# Patient Record
Sex: MALE | Race: BLACK OR AFRICAN AMERICAN | URBAN - METROPOLITAN AREA
[De-identification: names, ages, dates, MRNs, and addresses within clinical notes are randomized per-mention and may not be internally consistent; named-entity substitution may affect disease eponyms.]

---
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
## 2024-09-10 DIAGNOSIS — J45.40 ASTHMA, CHRONIC, MODERATE PERSISTENT, UNCOMPLICATED (HHS-HCC): ICD-10-CM

## 2024-09-10 RX ORDER — PREDNISONE 20 MG/1
40 TABLET ORAL DAILY
Qty: 10 TABLET | Refills: 0 | Status: SHIPPED | OUTPATIENT
Start: 2024-09-10

## 2024-09-10 RX ORDER — BUDESONIDE AND FORMOTEROL FUMARATE DIHYDRATE 160; 4.5 UG/1; UG/1
2 AEROSOL RESPIRATORY (INHALATION) 2 TIMES DAILY
COMMUNITY
End: 2024-09-10 | Stop reason: SDUPTHER

## 2024-09-10 RX ORDER — DILTIAZEM HYDROCHLORIDE 60 MG/1
2 TABLET, FILM COATED ORAL EVERY 4 HOURS PRN
COMMUNITY
End: 2024-09-10 | Stop reason: SDUPTHER

## 2024-09-10 RX ORDER — PREDNISONE 20 MG/1
40 TABLET ORAL DAILY
COMMUNITY
End: 2024-09-10 | Stop reason: SDUPTHER

## 2024-09-10 RX ORDER — BUDESONIDE AND FORMOTEROL FUMARATE DIHYDRATE 160; 4.5 UG/1; UG/1
2 AEROSOL RESPIRATORY (INHALATION) 2 TIMES DAILY
Qty: 10.2 G | Refills: 0 | Status: SHIPPED | OUTPATIENT
Start: 2024-09-10

## 2024-09-10 RX ORDER — BUDESONIDE AND FORMOTEROL FUMARATE DIHYDRATE 160; 4.5 UG/1; UG/1
2 AEROSOL RESPIRATORY (INHALATION) EVERY 4 HOURS PRN
Qty: 10.2 G | Refills: 0 | Status: SHIPPED | OUTPATIENT
Start: 2024-09-10 | End: 2025-09-10

## 2024-10-23 PROBLEM — Z91.09 ENVIRONMENTAL ALLERGIES: Chronic | Status: ACTIVE | Noted: 2024-10-23

## 2024-10-23 PROBLEM — R76.8 ELEVATED IGE LEVEL: Chronic | Status: ACTIVE | Noted: 2024-10-23

## 2024-10-23 PROBLEM — R94.2 PULMONARY FUNCTION STUDIES ABNORMAL: Chronic | Status: ACTIVE | Noted: 2024-10-23

## 2024-10-23 PROBLEM — T78.1XXA ORAL ALLERGY SYNDROME: Chronic | Status: ACTIVE | Noted: 2024-10-23

## 2024-10-24 ENCOUNTER — APPOINTMENT (OUTPATIENT)
Dept: PEDIATRIC PULMONOLOGY | Facility: CLINIC | Age: 19
End: 2024-10-24

## 2024-10-24 ENCOUNTER — ANCILLARY PROCEDURE (OUTPATIENT)
Dept: PEDIATRIC PULMONOLOGY | Facility: CLINIC | Age: 19
End: 2024-10-24
Payer: COMMERCIAL

## 2024-10-24 VITALS — HEIGHT: 71 IN | OXYGEN SATURATION: 96 % | HEART RATE: 90 BPM | WEIGHT: 315 LBS | BODY MASS INDEX: 44.1 KG/M2

## 2024-10-24 DIAGNOSIS — J45.40 ASTHMA, CHRONIC, MODERATE PERSISTENT, UNCOMPLICATED (HHS-HCC): Primary | ICD-10-CM

## 2024-10-24 DIAGNOSIS — J40 BRONCHITIS: ICD-10-CM

## 2024-10-24 DIAGNOSIS — Z91.09 ENVIRONMENTAL ALLERGIES: Chronic | ICD-10-CM

## 2024-10-24 DIAGNOSIS — Z23 FLU VACCINE NEED: ICD-10-CM

## 2024-10-24 DIAGNOSIS — R94.2 PULMONARY FUNCTION STUDIES ABNORMAL: Chronic | ICD-10-CM

## 2024-10-24 DIAGNOSIS — J45.909 ASTHMA, UNSPECIFIED ASTHMA SEVERITY, UNSPECIFIED WHETHER COMPLICATED, UNSPECIFIED WHETHER PERSISTENT (HHS-HCC): ICD-10-CM

## 2024-10-24 DIAGNOSIS — T78.1XXD POLLEN-FOOD ALLERGY, SUBSEQUENT ENCOUNTER: Chronic | ICD-10-CM

## 2024-10-24 DIAGNOSIS — J45.40 ASTHMA, CHRONIC, MODERATE PERSISTENT, UNCOMPLICATED (HHS-HCC): ICD-10-CM

## 2024-10-24 DIAGNOSIS — R76.8 ELEVATED IGE LEVEL: Chronic | ICD-10-CM

## 2024-10-24 DIAGNOSIS — J20.9 ACUTE BRONCHITIS, UNSPECIFIED ORGANISM: Chronic | ICD-10-CM

## 2024-10-24 LAB
MGC ASCENT PFT - FEV1 - PRE: 3.13
MGC ASCENT PFT - FEV1 - PREDICTED: 4.56
MGC ASCENT PFT - FVC - PRE: 5.17
MGC ASCENT PFT - FVC - PREDICTED: 5.3

## 2024-10-24 PROCEDURE — 90656 IIV3 VACC NO PRSV 0.5 ML IM: CPT | Performed by: PEDIATRICS

## 2024-10-24 PROCEDURE — 90471 IMMUNIZATION ADMIN: CPT | Performed by: PEDIATRICS

## 2024-10-24 PROCEDURE — 3008F BODY MASS INDEX DOCD: CPT | Performed by: PEDIATRICS

## 2024-10-24 PROCEDURE — 99214 OFFICE O/P EST MOD 30 MIN: CPT | Performed by: PEDIATRICS

## 2024-10-24 RX ORDER — AZITHROMYCIN 250 MG/1
TABLET, FILM COATED ORAL
Qty: 6 TABLET | Refills: 0 | Status: SHIPPED | OUTPATIENT
Start: 2024-10-24 | End: 2024-10-28

## 2024-10-24 RX ORDER — PREDNISONE 20 MG/1
40 TABLET ORAL DAILY
Qty: 10 TABLET | Refills: 0 | Status: SHIPPED | OUTPATIENT
Start: 2024-10-24

## 2024-10-24 RX ORDER — BUDESONIDE AND FORMOTEROL FUMARATE DIHYDRATE 160; 4.5 UG/1; UG/1
2 AEROSOL RESPIRATORY (INHALATION) 2 TIMES DAILY
Qty: 10.2 G | Refills: 0 | Status: SHIPPED | OUTPATIENT
Start: 2024-10-24

## 2024-10-24 RX ORDER — BUDESONIDE AND FORMOTEROL FUMARATE DIHYDRATE 80; 4.5 UG/1; UG/1
2 AEROSOL RESPIRATORY (INHALATION) EVERY 4 HOURS PRN
Qty: 10.2 G | Refills: 5 | Status: SHIPPED | OUTPATIENT
Start: 2024-10-24 | End: 2025-04-22

## 2024-11-07 DIAGNOSIS — Z91.09 ENVIRONMENTAL ALLERGIES: Chronic | ICD-10-CM

## 2024-11-07 DIAGNOSIS — J45.40 ASTHMA, CHRONIC, MODERATE PERSISTENT, UNCOMPLICATED (HHS-HCC): ICD-10-CM

## 2024-11-07 RX ORDER — EPINEPHRINE 0.3 MG/.3ML
0.3 INJECTION SUBCUTANEOUS ONCE
Status: SHIPPED | OUTPATIENT
Start: 2024-11-07

## 2024-12-20 ENCOUNTER — OFFICE VISIT (OUTPATIENT)
Dept: URGENT CARE | Age: 19
End: 2024-12-20
Payer: COMMERCIAL

## 2024-12-20 ENCOUNTER — APPOINTMENT (OUTPATIENT)
Dept: PEDIATRICS | Facility: CLINIC | Age: 19
End: 2024-12-20
Payer: COMMERCIAL

## 2024-12-20 VITALS
HEART RATE: 90 BPM | DIASTOLIC BLOOD PRESSURE: 90 MMHG | WEIGHT: 315 LBS | SYSTOLIC BLOOD PRESSURE: 142 MMHG | BODY MASS INDEX: 42.66 KG/M2 | HEIGHT: 72 IN | RESPIRATION RATE: 20 BRPM | TEMPERATURE: 98.3 F | OXYGEN SATURATION: 99 %

## 2024-12-20 DIAGNOSIS — G44.209 TENSION HEADACHE: Primary | ICD-10-CM

## 2024-12-20 PROBLEM — E78.00 ELEVATED LDL CHOLESTEROL LEVEL: Status: ACTIVE | Noted: 2024-12-20

## 2024-12-20 PROBLEM — L83 ACANTHOSIS NIGRICANS: Status: ACTIVE | Noted: 2024-12-20

## 2024-12-20 PROBLEM — E66.01 OBESITY, MORBID (MORE THAN 100 LBS OVER IDEAL WEIGHT OR BMI > 40) (MULTI): Status: ACTIVE | Noted: 2024-12-20

## 2024-12-20 PROBLEM — E78.6 ABNORMALLY LOW HIGH DENSITY LIPOPROTEIN (HDL) CHOLESTEROL WITH HYPERTRIGLYCERIDEMIA: Status: ACTIVE | Noted: 2024-12-20

## 2024-12-20 PROBLEM — E16.1 INCREASED INSULIN LEVEL: Status: ACTIVE | Noted: 2024-12-20

## 2024-12-20 PROBLEM — Q55.22 RETRACTILE TESTIS: Status: ACTIVE | Noted: 2024-12-20

## 2024-12-20 PROBLEM — T78.40XA ALLERGIC REACTION: Status: ACTIVE | Noted: 2024-12-20

## 2024-12-20 PROBLEM — H52.11 MYOPIA OF RIGHT EYE: Status: ACTIVE | Noted: 2024-12-20

## 2024-12-20 PROBLEM — E55.9 VITAMIN D DEFICIENCY: Status: ACTIVE | Noted: 2024-12-20

## 2024-12-20 PROBLEM — E78.1 ABNORMALLY LOW HIGH DENSITY LIPOPROTEIN (HDL) CHOLESTEROL WITH HYPERTRIGLYCERIDEMIA: Status: ACTIVE | Noted: 2024-12-20

## 2024-12-20 PROBLEM — J30.9 ALLERGIC RHINITIS: Status: ACTIVE | Noted: 2024-12-20

## 2024-12-20 RX ORDER — KETOROLAC TROMETHAMINE 30 MG/ML
30 INJECTION, SOLUTION INTRAMUSCULAR; INTRAVENOUS ONCE
Status: COMPLETED | OUTPATIENT
Start: 2024-12-20 | End: 2024-12-20

## 2024-12-20 RX ORDER — GUAIFENESIN 1200 MG
650 TABLET, EXTENDED RELEASE 12 HR ORAL EVERY 6 HOURS PRN
Qty: 30 CAPSULE | Refills: 0 | Status: SHIPPED | OUTPATIENT
Start: 2024-12-20

## 2024-12-20 RX ORDER — IBUPROFEN 600 MG/1
600 TABLET ORAL 4 TIMES DAILY PRN
Qty: 90 TABLET | Refills: 0 | Status: SHIPPED | OUTPATIENT
Start: 2024-12-20 | End: 2025-12-20

## 2024-12-20 ASSESSMENT — ENCOUNTER SYMPTOMS: HEADACHES: 1

## 2024-12-20 NOTE — LETTER
December 20, 2024     Patient: Carlitos Arora   YOB: 2005   Date of Visit: 12/20/2024       To Whom It May Concern:    Carlitos Arora was seen in my clinic on 12/20/2024 at 3:00 pm. Please excuse Carlitos for his absence from work on this day to make the appointment.    If you have any questions or concerns, please don't hesitate to call.         Sincerely,         Emmanuel Wasserman PA-C        CC: No Recipients

## 2024-12-20 NOTE — PATIENT INSTRUCTIONS
Alternate 600 mg ibuprofen and 650 mg acetaminophen every 3 hours    Follow up with primary care physician in 1-2 weeks

## 2024-12-20 NOTE — PROGRESS NOTES
Subjective   Patient ID: Carlitos Arora is a 19 y.o. male. They present today with a chief complaint of Headache.    History of Present Illness  Patient is a pleasant 19-year-old -American male, no significant past medical history, presented to clinic with complaint of headache.  Patient is reporting approximately 2-week history of a persistent waxing and waning headache.  Describes headache as having distribution.  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  Denies any light sensitivity or photophobia.  No lightheadedness or dizziness.  No numbness, tingling, or focal weakness.  Is on any blood thinners.  Denies any head injury or loss of consciousness.  States he has been trying Tylenol at home with minimal relief.  Denies any neck pain or stiffness.  No chest pain or shortness of breath.  No further complaints.      Headache      Past Medical History  Allergies as of 12/20/2024    (No Known Allergies)       (Not in a hospital admission)         Past Medical History:   Diagnosis Date    Encounter for other specified special examinations     Diagnostic skin test    Flat foot (pes planus) (acquired), right foot     Flat feet    Patient's noncompliance with other medical treatment and regimen due to unspecified reason 06/09/2019    Patient nonadherence    Personal history of diseases of the skin and subcutaneous tissue 07/05/2013    History of urticaria    Personal history of other diseases of the digestive system 06/13/2017    History of constipation    Personal history of other diseases of the respiratory system 09/06/2019    History of pharyngitis    Personal history of other diseases of the respiratory system 09/06/2019    History of sore throat    Personal history of other diseases of the respiratory system     History of upper respiratory infection    Personal history of other diseases of the respiratory system 04/21/2014    History of acute sinusitis     Personal history of other diseases of urinary system 06/13/2017    History of nocturnal enuresis    Personal history of other specified conditions 04/14/2020    History of snoring    Shortness of breath 11/02/2015    SOBOE (shortness of breath on exertion)    Unspecified asthma with (acute) exacerbation ([facility]) 12/05/2016    Asthma exacerbation       Past Surgical History:   Procedure Laterality Date    CIRCUMCISION, PRIMARY  06/17/2014    Elective Circumcision            Review of Systems  Review of Systems   Neurological:  Positive for headaches.                                  Objective    Vitals:    12/20/24 1437   BP: 142/90   Pulse: 90   Resp: 20   Temp: 36.8 °C (98.3 °F)   TempSrc: Temporal   SpO2: 99%   Weight: (!) 177 kg (390 lb)   Height: 1.829 m (6')     No LMP for male patient.    Physical Exam  Constitutional: Alert, oriented, cooperative, in no acute distress. Appears well nourished and well hydrated.    Head: Normocephalic, atraumatic    Skin: Intact, dry skin. No lesions, rash, petechiae, or purpura.    Eyes: PERRL, EOMs intact, conjunctiva pink with no erythema or exudates. No scleral icterus. Eyelids without lesions.    ENT: Hearing grossly intact. No external deformities. Tympanic membranes intact with visible landmarks. Nares patent, mucus membranes moist. Dentition without lesions. Pharynx clear, uvula midline.    Neck: Trachea midline, no lymphadenopathy. No meningismus.     Pulmonary: Lungs clear to auscultation bilaterally with good chest wall excursion. No rales, rhonchi, or wheezing. No accessory muscle use or stridor.     Cardiac: Regular rate and rhythm. Normal S1 and S2 with no murmur, rub, or gallop. No JVD, carotids without bruits. 2+ DP and PT pulses.     Abdomen: Soft, nontender, normoactive bowel sounds. No palpable organomegaly or mass. No rebound or guarding. No CVA tenderness.     Genitourinary: Exam deferred.    Musculoskeletal: Full range of motion in extremities. No pain,  edema, or deformities. Peripheral pulses full and equal. No cyanosis, or clubbing.     Neurological: Cranial nerves II through XII are grossly intact. Normal sensation, no weakness, no focal findings identified.     Psychiatric: Appropriate mood and affect. Calm  Procedures    Point of Care Test & Imaging Results from this visit    No results found.    Diagnostic study results (if any) were reviewed by Emmanuel Wasserman PA-C.    Assessment/Plan   Allergies, medications, history, and pertinent labs/EKGs/Imaging reviewed by Emmanuel Wasserman PA-C.     Medical Decision Making  Patient was seen eval in the clinic with complaint of headache.  On exam patient is nontoxic very well-appearing respect comfortably no acute distress.  Vital signs are stable, afebrile.  Chest is clear, heart is regular, belly is diffusely soft and nontender.  He is neurovascular intact throughout with no focal deficits noted at this time.  Pupils are equal round reactive to light with intact EOMs without nystagmus.  Given his history and physical exam I feel this is a tension type headache.  Will write 30 mg Toradol IM in the clinic.  He was observed for 30 minutes and he is reporting improvement in his headache.  Discharged home at this time instructed follow-up with his primary care physician in the next week.  Advised to alternate Tylenol and ibuprofen at home as needed for pain.  Advised to immediately report to the ED for any new or worsening symptoms.  Reviewed my impression, plan, strict return versus report to ED precautions with the patient.  He expresses understanding and agreement plan of care.      Orders and Diagnoses  There are no diagnoses linked to this encounter.      Medical Admin Record      Follow Up Instructions  No follow-ups on file.    Patient disposition: Home    Electronically signed by Emmanuel Wasserman PA-C  2:43 PM

## 2025-01-13 ENCOUNTER — APPOINTMENT (OUTPATIENT)
Dept: RADIOLOGY | Facility: HOSPITAL | Age: 20
End: 2025-01-13
Payer: COMMERCIAL

## 2025-01-13 ENCOUNTER — HOSPITAL ENCOUNTER (EMERGENCY)
Facility: HOSPITAL | Age: 20
Discharge: HOME | End: 2025-01-13
Payer: COMMERCIAL

## 2025-01-13 VITALS
RESPIRATION RATE: 15 BRPM | DIASTOLIC BLOOD PRESSURE: 78 MMHG | SYSTOLIC BLOOD PRESSURE: 130 MMHG | WEIGHT: 315 LBS | TEMPERATURE: 97.7 F | HEIGHT: 72 IN | HEART RATE: 88 BPM | BODY MASS INDEX: 42.66 KG/M2 | OXYGEN SATURATION: 95 %

## 2025-01-13 DIAGNOSIS — R51.9 ACUTE NONINTRACTABLE HEADACHE, UNSPECIFIED HEADACHE TYPE: Primary | ICD-10-CM

## 2025-01-13 PROCEDURE — 99284 EMERGENCY DEPT VISIT MOD MDM: CPT | Mod: 25

## 2025-01-13 PROCEDURE — 2500000001 HC RX 250 WO HCPCS SELF ADMINISTERED DRUGS (ALT 637 FOR MEDICARE OP): Performed by: NURSE PRACTITIONER

## 2025-01-13 PROCEDURE — 70450 CT HEAD/BRAIN W/O DYE: CPT | Performed by: RADIOLOGY

## 2025-01-13 PROCEDURE — 70450 CT HEAD/BRAIN W/O DYE: CPT

## 2025-01-13 RX ORDER — ACETAMINOPHEN 325 MG/1
650 TABLET ORAL ONCE
Status: COMPLETED | OUTPATIENT
Start: 2025-01-13 | End: 2025-01-13

## 2025-01-13 RX ORDER — IBUPROFEN 600 MG/1
600 TABLET ORAL ONCE
Status: COMPLETED | OUTPATIENT
Start: 2025-01-13 | End: 2025-01-13

## 2025-01-13 RX ORDER — BUTALBITAL, ACETAMINOPHEN AND CAFFEINE 50; 325; 40 MG/1; MG/1; MG/1
1 TABLET ORAL EVERY 6 HOURS PRN
Qty: 16 TABLET | Refills: 0 | Status: SHIPPED | OUTPATIENT
Start: 2025-01-13 | End: 2025-01-17

## 2025-01-13 RX ADMIN — ACETAMINOPHEN 650 MG: 325 TABLET ORAL at 11:09

## 2025-01-13 RX ADMIN — IBUPROFEN 600 MG: 600 TABLET, FILM COATED ORAL at 11:09

## 2025-01-13 ASSESSMENT — COLUMBIA-SUICIDE SEVERITY RATING SCALE - C-SSRS
1. IN THE PAST MONTH, HAVE YOU WISHED YOU WERE DEAD OR WISHED YOU COULD GO TO SLEEP AND NOT WAKE UP?: NO
2. HAVE YOU ACTUALLY HAD ANY THOUGHTS OF KILLING YOURSELF?: NO
6. HAVE YOU EVER DONE ANYTHING, STARTED TO DO ANYTHING, OR PREPARED TO DO ANYTHING TO END YOUR LIFE?: NO

## 2025-01-13 ASSESSMENT — PAIN - FUNCTIONAL ASSESSMENT: PAIN_FUNCTIONAL_ASSESSMENT: 0-10

## 2025-01-13 ASSESSMENT — PAIN SCALES - GENERAL
PAINLEVEL_OUTOF10: 4
PAINLEVEL_OUTOF10: 7

## 2025-01-13 ASSESSMENT — PAIN DESCRIPTION - PAIN TYPE: TYPE: ACUTE PAIN

## 2025-01-13 ASSESSMENT — PAIN DESCRIPTION - LOCATION: LOCATION: HEAD

## 2025-01-13 NOTE — ED PROVIDER NOTES
HPI   Chief Complaint   Patient presents with    Headache       HPI  See my MDM      Patient History   Past Medical History:   Diagnosis Date    Encounter for other specified special examinations     Diagnostic skin test    Flat foot (pes planus) (acquired), right foot     Flat feet    Patient's noncompliance with other medical treatment and regimen due to unspecified reason 06/09/2019    Patient nonadherence    Personal history of diseases of the skin and subcutaneous tissue 07/05/2013    History of urticaria    Personal history of other diseases of the digestive system 06/13/2017    History of constipation    Personal history of other diseases of the respiratory system 09/06/2019    History of pharyngitis    Personal history of other diseases of the respiratory system 09/06/2019    History of sore throat    Personal history of other diseases of the respiratory system     History of upper respiratory infection    Personal history of other diseases of the respiratory system 04/21/2014    History of acute sinusitis    Personal history of other diseases of urinary system 06/13/2017    History of nocturnal enuresis    Personal history of other specified conditions 04/14/2020    History of snoring    Shortness of breath 11/02/2015    SOBOE (shortness of breath on exertion)    Unspecified asthma with (acute) exacerbation ([facility]) 12/05/2016    Asthma exacerbation     Past Surgical History:   Procedure Laterality Date    CIRCUMCISION, PRIMARY  06/17/2014    Elective Circumcision     Family History   Problem Relation Name Age of Onset    Other (morbid obesity) Mother      Other (seasonal allergies) Mother      Asthma Father      Other (seasonal allergies) Sister      Sickle cell trait Sister      Ovarian cancer Maternal Grandmother      Epilepsy Maternal Grandmother      Seizures Maternal Grandmother      Other (adenocarcinoma) Maternal Grandmother      Other (malignant neoplasm breast) Maternal Grandmother      Asthma  Other          paternal cousin    Other (congenital heart defect) Other          paternal cousin    Other (genetic predisposition to cancer) Other          GM, MGGM    Marfan syndrome Other          paternal cousin     Social History     Tobacco Use    Smoking status: Not on file    Smokeless tobacco: Not on file   Substance Use Topics    Alcohol use: Not on file    Drug use: Not on file       Physical Exam   ED Triage Vitals [01/13/25 1017]   Temperature Heart Rate Respirations BP   36.5 °C (97.7 °F) 93 16 131/89      Pulse Ox Temp Source Heart Rate Source Patient Position   98 % Temporal Monitor --      BP Location FiO2 (%)     -- --       Physical Exam  CONSTITUTIONAL: Vital signs reviewed as charted, well-developed and in no distress  Eyes: Extraocular muscles are intact. Pupils equal round and reactive to light. Conjunctiva are pink.    ENT: Mucous membranes are moist. Tongue in the midline. Pharynx with erythema but no edema or exudates.  Uvula midline.  Nasal turbinates are red and inflamed.  LUNGS: Breath sounds equal and clear to auscultation. Good air exchange, no wheezes rales or retractions, pulse oximetry is charted.  HEART: Regular rate and rhythm without murmur thrill or rub, strong tones, auscultation is normal.  ABDOMEN: Soft and nontender without guarding rebound rigidity or mass. Bowel sounds are present and normal in all quadrants. There is no palpable masses or aneurysms identified. No hepatosplenomegaly, normal abdominal exam.  Neuro: The patient is awake, alert and oriented ×3. Moving all 4 extremities and answering questions appropriately.   MUSCULOSKELETAL: The calves are nontender to palpation. Full gross active range of motion.   PSYCH: Awake alert oriented, normal mood and affect.  Skin:  Dry, normal color, warm to the touch, no rash present.        ED Course & MDM   Diagnoses as of 01/13/25 1152   Acute nonintractable headache, unspecified headache type                 No data recorded                                  Medical Decision Making  History obtained from: patient    Vital signs, nursing notes, current medications, past medical history, Surgical history, allergies, social history, family History were reviewed.         HPI:  Patient is a 19-year-old male presenting emergency room today for evaluation of a headache that is been ongoing for 2 months.  Was sent here from urgent care.  He denies fever chills or night sweats.  Denies nausea vomiting or diarrhea.  Denies change in vision or feeling off balance.  States it has been constant.  States he feels sick of having the discomfort.  He has not seen anybody prior to today      10 point ROS was reviewed and negative except Noted above in HPI.  DDX: as listed above          MDM Summary/considerations:  Labs Reviewed - No data to display  CT head wo IV contrast   Final Result   No acute intracranial process.                  MACRO:   None.        Signed by: Cosme Pennington 1/13/2025 11:33 AM   Dictation workstation:   [id number]        Medications   acetaminophen (Tylenol) tablet 650 mg (650 mg oral Given 1/13/25 1109)   ibuprofen tablet 600 mg (600 mg oral Given 1/13/25 1109)     Discharge Medication List as of 1/13/2025 11:42 AM        START taking these medications    Details   butalbital-acetaminophen-caff -40 mg tablet Take 1 tablet by mouth every 6 hours if needed for headaches for up to 4 days., Starting Mon 1/13/2025, Until Fri 1/17/2025 at 2359, Normal           I estimate there is a low risk for subarachnoid hemorrhage, cavernous sinus venous thrombosis, meningitis, intracranial hemorrhage, subdural hematoma, or stroke, thus I considered the discharge disposition reasonable. We have discussed the diagnosis and risks, and we agreed with discharging home to follow-up with her primary care doctor. We also discussed returning to the emergency department immediately if new or worsening symptoms occur. We have discussed the symptoms  which are most concerning such as changing or worsening pain, weakness, vomiting, or fever and necessitate immediate return.        CT scan grossly unremarkable patient feeling better after meds here in the ED given PCP and neurology referral.  Was discharged home in stable condition        All of the patient's questions were answered to the best of my ability.  Patient states understanding that they have been screened for an emergency today and we have not found any etiology of symptoms that requires emergent treatment or admission to the hospital at this point. They understand that they have not had definitive care day and require follow-up for treatment of their condition. They also state understanding that they may have an emergent condition that may potentially have not of detected at this visit and they must return to the emergency department if they develop any worsening of symptoms or new complaints.      I have evaluated this patient, my supervising physician was available for consultation.              Critical Care: Not warranted at this time        This chart was completed using voice recognition transcription software. Please excuse any errors of transcription including grammatical, punctuation, syntax and spelling errors.  Please contact me with any questions regarding this chart.    Procedure  Procedures     AHSAN Magana-CNP  01/13/25 1458

## 2025-02-07 ENCOUNTER — TELEPHONE (OUTPATIENT)
Dept: ALLERGY | Facility: CLINIC | Age: 20
End: 2025-02-07
Payer: COMMERCIAL

## 2025-02-10 ENCOUNTER — APPOINTMENT (OUTPATIENT)
Dept: ALLERGY | Facility: CLINIC | Age: 20
End: 2025-02-10
Payer: COMMERCIAL

## 2025-02-10 VITALS
BODY MASS INDEX: 54.03 KG/M2 | SYSTOLIC BLOOD PRESSURE: 125 MMHG | DIASTOLIC BLOOD PRESSURE: 84 MMHG | OXYGEN SATURATION: 95 % | RESPIRATION RATE: 18 BRPM | WEIGHT: 315 LBS | TEMPERATURE: 97.2 F | HEART RATE: 102 BPM

## 2025-02-10 DIAGNOSIS — J30.89 ALLERGIC RHINITIS DUE TO DUST MITE: ICD-10-CM

## 2025-02-10 DIAGNOSIS — T78.1XXA ADVERSE REACTION TO FOOD, INITIAL ENCOUNTER: Primary | ICD-10-CM

## 2025-02-10 DIAGNOSIS — J45.40 MODERATE PERSISTENT ASTHMA WITHOUT COMPLICATION (HHS-HCC): ICD-10-CM

## 2025-02-10 DIAGNOSIS — J30.1 SEASONAL ALLERGIC RHINITIS DUE TO POLLEN: ICD-10-CM

## 2025-02-10 DIAGNOSIS — J30.81 ALLERGIC RHINITIS DUE TO ANIMAL HAIR AND DANDER: ICD-10-CM

## 2025-02-10 PROCEDURE — 99204 OFFICE O/P NEW MOD 45 MIN: CPT | Performed by: ALLERGY & IMMUNOLOGY

## 2025-02-10 PROCEDURE — 94060 EVALUATION OF WHEEZING: CPT | Performed by: ALLERGY & IMMUNOLOGY

## 2025-02-10 PROCEDURE — 95004 PERQ TESTS W/ALRGNC XTRCS: CPT | Performed by: ALLERGY & IMMUNOLOGY

## 2025-02-10 RX ORDER — FLUTICASONE PROPIONATE 50 MCG
2 SPRAY, SUSPENSION (ML) NASAL DAILY
Qty: 16 G | Refills: 11 | Status: SHIPPED | OUTPATIENT
Start: 2025-02-10 | End: 2026-02-10

## 2025-02-10 RX ORDER — INHALER, ASSIST DEVICES
SPACER (EA) MISCELLANEOUS
Qty: 1 EACH | Refills: 0 | Status: SHIPPED | OUTPATIENT
Start: 2025-02-10

## 2025-02-10 RX ORDER — CETIRIZINE HYDROCHLORIDE 10 MG/1
10 TABLET ORAL DAILY PRN
Qty: 30 TABLET | Refills: 11 | Status: SHIPPED | OUTPATIENT
Start: 2025-02-10 | End: 2026-02-10

## 2025-02-10 ASSESSMENT — ENCOUNTER SYMPTOMS
MUSCULOSKELETAL NEGATIVE: 1
EYES NEGATIVE: 1
CONSTITUTIONAL NEGATIVE: 1
ALLERGIC/IMMUNOLOGIC NEGATIVE: 1
GASTROINTESTINAL NEGATIVE: 1
HEADACHES: 1
RESPIRATORY NEGATIVE: 1
HEMATOLOGIC/LYMPHATIC NEGATIVE: 1
CARDIOVASCULAR NEGATIVE: 1

## 2025-02-10 NOTE — LETTER
February 10, 2025     Kathi Warner MD  [address] [place] [address]  [facility], [address]  [place] [address]    Patient: Carlitos Arora   YOB: 2005   Date of Visit: 2/10/2025       Dear Dr. Kathi Warner MD:    Thank you for referring Carltios Arora to me for evaluation. Below are my notes for this consultation.  If you have questions, please do not hesitate to call me. I look forward to following your patient along with you.       Sincerely,     Princess MICHAEL Hernández MD      CC: aJy Uribe MD  ______________________________________________________________________________________    Carlitos Arora presents for initial evaluation today for concern for allergies.     He provides the following history:    Rhinitis: He reports that he gets itchy eyes, runny nose, sneezing, nasal congestion occurring since childhood, and worsened with change in seasons.  He uses Benadryl as needed.  He reports having allergy testing around age 7, but does not recall results.     Asthma: He reports that he was diagnosed with asthma around age 1.  He was last hospitalized with asthma in the PICU at age 9 (4/2014).  He follows with Dr. Uribe for his asthma and is on Symbicort 160-4.5 2 puffs twice daily.  He does not use a spacer.  He does use Symbicort for rescue therapy.  He last used rescue therapy a few months ago.  He notes that flares generally occur with exercise or URIs.  His last spirometry showed FEV1 68% predicted with DAVID 10 on 10/24/2024 at the time of bronchitis.  He was treated with azithromycin that visit.  Currently denies any nocturnal symptoms and feels that his asthma is well-controlled.  Does have a prior history of of OCS usage twice in 2024.     Eczema: Denies    Food allergy: Reports reaction to shellfish in childhood.  He reports that shrimp caused throat closing.  He does not have an EpiPen.  He tolerates finned fish including tuna, salmon, cod with no problem.  He has not tried other finned fish  due to concerns for allergies related to his shellfish allergy.  He tolerates egg, milk, wheat, soy, peanuts, tree nuts, sesame.      Venom allergy:  Denies     Drug allergy: Reports that aspirin causes itching.  He tolerates ibuprofen with no problem    Environmental History:  Type of home:  House  Pets in the house: Dog   Mold or moisture in the home: None  Bedroom maryanne: Hardwood  Dust mite covers on bed:  No  Cigarette exposure in the home: Does not smoke tobacco.  Smokes marijuana  Occupation/School: welding    Pertinent Allergy/Immunology family history:  Mom with allergic rhinitis (follows in practice)  Dad with shellfish allergy     Review of Systems   Constitutional: Negative.    HENT: Negative.     Eyes: Negative.    Respiratory: Negative.     Cardiovascular: Negative.    Gastrointestinal: Negative.    Musculoskeletal: Negative.    Skin: Negative.    Allergic/Immunologic: Negative.    Neurological:  Positive for headaches.   Hematological: Negative.      Vital signs:  /84   Pulse 102   Temp 36.2 °C (97.2 °F)   Resp 18   Wt (!) 181 kg (398 lb 6.4 oz)   SpO2 95%   BMI 54.03 kg/m²      Physical Exam:  GENERAL: Alert, oriented and in no acute distress.     HEENT: EYES: No conjunctival injection or cobblestoning. Nose: nasal turbinates mildly edematous and are not boggy.  There is no mucous stranding, polyps, or blood noted. EARS: Tympanic membranes are clear. MOUTH: moist and pink with no exudates, ulcers, or thrush. NECK: No upper airway stridor noted.       HEART: regular rate and rhythm.       LUNGS: Clear to auscultation bilaterally. No wheezing, rhonchi or rales.        ABDOMEN: Positive bowel sounds, soft, nontender, nondistended.       EXTREMITIES: No clubbing or edema.        NEURO:  Normal affect.  Gait normal.      SKIN: No rash, hives, or angioedema noted    Environmental Allergy Testing:  Test Results (wheal/flare in mm)    Controls  Controls  Histamine: 8/35  Negative:  0    Trees:  Trees  American Beech: 0  Porfirio:   Birch: 0  Black [place]: 0  Jewett: 0  [place]: 0  [place]: 0  Elm: 0  [place]:   Maple: 0  [place]: 0  Era: 0  [place]: 0  White poplar: 0     Grasses:  Grass  Bahia: 4/10  Bermuda: 0  Vitor: 0  KORT Grass Mix:   Sweet Vernal: 5/15    Weeds:  Weeds  Cocklebur: 0  Dandelion: 0  English Plantain: 0  Goldenrod: 0  Lambs Quarters: 0  Mugwort: 0  Pigweed: 0  Ragweed Mix: 0  Russian Thistle: 0  Yellow Dock: 0     Molds:  Molds  Alternaria: 0  Aspergillus: 0  Cladosporium: 0  Helminthosporium: 5/15  Penicillium: 0  Stemphyllium: 5/10    Animals/Dust Mite:  Animals  Cat: 5/10  AP do/20  [place]breno Do/10  Mouse: 0  Cockroach: 4/10  Dust Mite F: 10/30  Dust Mite P:        Food Allergy Test Results  Fish  Catfish: 0  [place]: 0  Mackerel: 0  Perch: 0  Shellfish  Clam: 0  Crab: 0  Lobster: 0  Scallops: 0  Shrimp: 0     Office spirometry 02/10/25:  FEV1: 83% predicted  FVC:  113% predicted  FEV1/FVC: 0.627  FEF 25-75: 42% predicted   Inspiratory loops: flattened on most attempts   Post-bronchodilator assessment: FEV1: 92% (11% change)    Impression:  1. Adverse reaction to food, initial encounter    2. Moderate persistent asthma without complication ([facility])    3. Seasonal allergic rhinitis due to pollen    4. Allergic rhinitis due to dust mite    5. Allergic rhinitis due to animal hair and dander      Assessment and Plan:  Allergic rhinitis, seasonal and perennial:  Carlitos was found to have significant sensitivity to tree, grass, cat, dog, dust mite, cockroach, and mold on aeroallergen skin prick testing today.  We discussed treatments for allergic rhinitis to include avoidance, medication, and allergen immunotherapy.  We discussed interventions for dust mite control, and provided  information on dust mite encasements for the bedding.  Recommend Flonase 2 sprays each nostril once daily and may use cetirizine 10 mg once daily as needed.  We  reviewed proper nasal spray administration technique. We also discussed starting nasal saline sinus rinse, and have provided with information on this.  If he does not achieve adequate control with medication and/or would like to decrease overall medication use, allergen immunotherapy would be an option to consider in the future.    Asthma, moderate persistent:  Office spirometry shows FEV1 83% predicted with 11% postbronchodilator change.  He is on Symbicort 2 puffs twice daily and as needed (Smart therapy) and follows with Peds Pulmonary, Dr. Uribe.  We performed spacer teaching today and spacer was prescribed.  We discussed that better control of upper airway inflammation from allergic rhinitis can help with lower airway control.    Adverse reaction to food, initial encounter:  Allergy testing negative to fish and shellfish.  Will further evaluate with shellfish specific IgE.  Will make further recommendations pending results of testing.    Plan for follow up in 6 months or sooner if needed

## 2025-02-10 NOTE — PROGRESS NOTES
Carlitos Arora presents for initial evaluation today for concern for allergies.     He provides the following history:    Rhinitis: He reports that he gets itchy eyes, runny nose, sneezing, nasal congestion occurring since childhood, and worsened with change in seasons.  He uses Benadryl as needed.  He reports having allergy testing around age 7, but does not recall results.     Asthma: He reports that he was diagnosed with asthma around age 1.  He was last hospitalized with asthma in the PICU at age 9 (4/2014).  He follows with Dr. Uribe for his asthma and is on Symbicort 160-4.5 2 puffs twice daily.  He does not use a spacer.  He does use Symbicort for rescue therapy.  He last used rescue therapy a few months ago.  He notes that flares generally occur with exercise or URIs.  His last spirometry showed FEV1 68% predicted with DAVID 10 on 10/24/2024 at the time of bronchitis.  He was treated with azithromycin that visit.  Currently denies any nocturnal symptoms and feels that his asthma is well-controlled.  Does have a prior history of of OCS usage twice in 2024.     Eczema: Denies    Food allergy: Reports reaction to shellfish in childhood.  He reports that shrimp caused throat closing.  He does not have an EpiPen.  He tolerates finned fish including tuna, salmon, cod with no problem.  He has not tried other finned fish due to concerns for allergies related to his shellfish allergy.  He tolerates egg, milk, wheat, soy, peanuts, tree nuts, sesame.      Venom allergy:  Denies     Drug allergy: Reports that aspirin causes itching.  He tolerates ibuprofen with no problem    Environmental History:  Type of home:  House  Pets in the house: Dog   Mold or moisture in the home: None  Bedroom maryanne: Hardwood  Dust mite covers on bed:  No  Cigarette exposure in the home: Does not smoke tobacco.  Smokes marijuana  Occupation/School: welding    Pertinent Allergy/Immunology family history:  Mom with allergic rhinitis (follows  in practice)  Dad with shellfish allergy     Review of Systems   Constitutional: Negative.    HENT: Negative.     Eyes: Negative.    Respiratory: Negative.     Cardiovascular: Negative.    Gastrointestinal: Negative.    Musculoskeletal: Negative.    Skin: Negative.    Allergic/Immunologic: Negative.    Neurological:  Positive for headaches.   Hematological: Negative.      Vital signs:  /84   Pulse 102   Temp 36.2 °C (97.2 °F)   Resp 18   Wt (!) 181 kg (398 lb 6.4 oz)   SpO2 95%   BMI 54.03 kg/m²      Physical Exam:  GENERAL: Alert, oriented and in no acute distress.     HEENT: EYES: No conjunctival injection or cobblestoning. Nose: nasal turbinates mildly edematous and are not boggy.  There is no mucous stranding, polyps, or blood noted. EARS: Tympanic membranes are clear. MOUTH: moist and pink with no exudates, ulcers, or thrush. NECK: No upper airway stridor noted.       HEART: regular rate and rhythm.       LUNGS: Clear to auscultation bilaterally. No wheezing, rhonchi or rales.        ABDOMEN: Positive bowel sounds, soft, nontender, nondistended.       EXTREMITIES: No clubbing or edema.        NEURO:  Normal affect.  Gait normal.      SKIN: No rash, hives, or angioedema noted    Environmental Allergy Testing:  Test Results (wheal/flare in mm)    Controls  Controls  Histamine: 8/35  Negative: 0    Trees:  Trees  American Beech: 0  Porfirio: 6/35  Birch: 0  Black [place]: 0  [place]: 0  [place]: 0  [place]astern [place]: 0  Elm: 0  [place]: 5/25  Maple: 0  [place]: 0  [place]: 0  [place]: 0  White poplar: 0     Grasses:  Grass  Bahia: 4/10  [place]erm[place]: 0  Vitor: 0  KORT Grass Mix: 7/25  Sweet Vernal: 5/15    Weeds:  Weeds  Cocklebur: 0  Dandelion: 0  English Plantain: 0  Goldenrod: 0  Lambs Quarters: 0  Mugwort: 0  Pigweed: 0  Ragweed Mix: 0  Russian Thistle: 0  Yellow Dock: 0     Molds:  Molds  Alternaria: 0  Aspergillus: 0  Cladosporium: 0  Helminthosporium: 5/15  Penicillium: 0  Stemphyllium:  5/10    Animals/Dust Mite:  Animals  Cat: 5/10  AP do/20  Membreno Do/10  Mouse: 0  Cockroach: 4/10  Dust Mite F: 10/30  Dust Mite P:        Food Allergy Test Results  Fish  Catfish: 0  [place]: 0  Mackerel: 0  Perch: 0  Shellfish  Clam: 0  Crab: 0  Lobster: 0  Scallops: 0  Shrimp: 0     Office spirometry 02/10/25:  FEV1: 83% predicted  FVC:  113% predicted  FEV1/FVC: 0.627  FEF 25-75: 42% predicted   Inspiratory loops: flattened on most attempts   Post-bronchodilator assessment: FEV1: 92% (11% change)    Impression:  1. Adverse reaction to food, initial encounter    2. Moderate persistent asthma without complication ([facility])    3. Seasonal allergic rhinitis due to pollen    4. Allergic rhinitis due to dust mite    5. Allergic rhinitis due to animal hair and dander      Assessment and Plan:  Allergic rhinitis, seasonal and perennial:  Carlitos was found to have significant sensitivity to tree, grass, cat, dog, dust mite, cockroach, and mold on aeroallergen skin prick testing today.  We discussed treatments for allergic rhinitis to include avoidance, medication, and allergen immunotherapy.  We discussed interventions for dust mite control, and provided  information on dust mite encasements for the bedding.  Recommend Flonase 2 sprays each nostril once daily and may use cetirizine 10 mg once daily as needed.  We reviewed proper nasal spray administration technique. We also discussed starting nasal saline sinus rinse, and have provided with information on this.  If he does not achieve adequate control with medication and/or would like to decrease overall medication use, allergen immunotherapy would be an option to consider in the future.    Asthma, moderate persistent:  Office spirometry shows FEV1 83% predicted with 11% postbronchodilator change.  He is on Symbicort 2 puffs twice daily and as needed (Smart therapy) and follows with Peds Pulmonary, Dr. Uribe.  We performed spacer teaching today and spacer  was prescribed.  We discussed that better control of upper airway inflammation from allergic rhinitis can help with lower airway control.    Adverse reaction to food, initial encounter:  Allergy testing negative to fish and shellfish.  Will further evaluate with shellfish specific IgE.  Will make further recommendations pending results of testing.    Plan for follow up in 6 months or sooner if needed

## 2025-02-10 NOTE — PATIENT INSTRUCTIONS
It was nice to meet you today     Your environmental allergy testing today was positive to tree and grass pollen, cat, dog, dust mite, cockroach.  Please see below for environmental allergen avoidance recommendations.     You may use Flonase 2 sprays each nostril once daily    Technique for nasal spray administration:  First, look slightly down, breathe normally, you do not need to sniff while you spray.  To use the nose spray, place the tip in your nose with the opposite hand (left hand, right side of nose, right hand, left side of nose), and aim or point toward the outside of the nose.  Do not sniff or snort medication afterwards as this can cause most of the medication to be swallowed.  Rather, dab your nose with a tissue if any runs out.    You may use Cetirizine (Zyrtec) 10 mg once daily as needed     Continue Symbicort 2 puffs twice daily and as needed (SMART) therapy.  Recommend using spacer with inhalers.  A spacer prescription has been sent to your pharmacy.     Please have labs drawn to further evaluate . Please note that some labs will come back sooner than others.  We will contact you when all labs have returned so that we can discuss results.     We would like to see you in follow up in 6 months or sooner if needed    Environmental Allergy Testing:  Test Results (wheal/flare in mm)    Controls  Controls  Histamine: 8/35  Negative: 0    Trees:  Trees  American Beech: 0  Porfirio: 6/35  Birch: 0  Black [place]: 0  [place]: 0  [place]: 0  [place]astern [place]: 0  Elm: 0  [place]: 5/25  Maple: 0  [place]: 0  [place]: 0  Timber: 0  White poplar: 0     Grasses:  Grass  Bahia: 4/10  [place]: 0  Vitor: 0  KORT Grass Mix: 7/25  Sweet Vernal: 5/15    Weeds:  Weeds  Cocklebur: 0  Dandelion: 0  English Plantain: 0  Goldenrod: 0  Lambs Quarters: 0  Mugwort: 0  Pigweed: 0  Ragweed Mix: 0  Russian Thistle: 0  Yellow Dock: 0     Molds:  Molds  Alternaria: 0  Aspergillus: 0  Cladosporium: 0  Helminthosporium: 5/15  Penicillium:  0  Stemphyllium: 5/10    Animals/Dust Mite:  Animals  Cat: 5/10  AP do/20  Membreno Do/10  Mouse: 0  Cockroach: 4/10  Dust Mite F: 10/30  Dust Mite P:      Food Allergy Test Results  Fish  Catfish: 0  [place]: 0  Mackerel: 0  Perch: 0  Shellfish  Clam: 0  Crab: 0  Lobster: 0  Scallops: 0  Shrimp: 0       Thank you for your visit to the [facility].  Part of a successful visit is taking home the information you learned today and using it to make things better.  These take home instructions are to help you, if you have questions or concerns, please contact us.     Please see below for recommendations on environmental allergy control or modification:     Pollen Avoidance--If you are sensitive to pollen, there are a few tips to help limit, but not avoid, exposure.     Minimize outdoor activity between 5am-10am, pollen levels are highest at this time.       Keep car windows closed when traveling.     Close house windows at night, use the air conditioning if necessary.     Check the pollen count to know what pollen is outside (http://aaaai.org/nab).       , Pet Avoidance     Keep pets out of the bedroom at all times.     Keep pets off of furniture and other surfaces like counter tops.     More frequent bathing can help      Groom your pet outside so hair and dander stay outside the home when brushed.     Consider using HEPA air purifier in bedroom    , and Dust mite control.            1.  Dust mite proof covers/encasing on the mattress, pillow and box spring.            2.  Wash sheets and bed linens in hot water each week.          3.  Vacuum carpets in bedroom each week.          4.  Dust or wipe down any hard surfaces.          5.  Avoid using a humidifier in the bedroom      Limit Cigarette smoke exposure.  Smoking and second hand smoke can irritate the nose and sinuses.  You and the people around you will benefit from avoiding  cigarette smoke.

## 2025-02-11 LAB
CRAB IGE QN: 1.05 KU/L
CRAB IGE RAST: 2
IGE SERPL-ACNC: 2464 KU/L
LOBSTER IGE QN: 0.42 KU/L
LOBSTER IGE RAST: 1
REF LAB TEST REF RANGE: NORMAL
SHRIMP IGE QN: 0.81 KU/L
SHRIMP IGE RAST: 2

## 2025-02-12 NOTE — TELEPHONE ENCOUNTER
Low positive shellfish IgE, which may be cross-reactive with dust mite/cockroach allergy as discussed at visit.  Recommend oral food challenge to clarify if he would like to liberalize diet.

## 2025-02-12 NOTE — TELEPHONE ENCOUNTER
Called and spoke to patient. Reviewed results and recommendations with patient. Reviewed food challenge with patient and provided division phone number for scheduling. Patient denied questions at this time.

## 2025-05-30 DIAGNOSIS — J45.40 ASTHMA, CHRONIC, MODERATE PERSISTENT, UNCOMPLICATED (HHS-HCC): ICD-10-CM

## 2025-06-02 RX ORDER — BUDESONIDE AND FORMOTEROL FUMARATE DIHYDRATE 160; 4.5 UG/1; UG/1
AEROSOL RESPIRATORY (INHALATION)
Qty: 20.4 G | Refills: 3 | Status: SHIPPED | OUTPATIENT
Start: 2025-06-02

## 2025-06-12 ENCOUNTER — APPOINTMENT (OUTPATIENT)
Dept: PRIMARY CARE | Facility: CLINIC | Age: 20
End: 2025-06-12
Payer: COMMERCIAL

## 2025-06-12 VITALS
HEIGHT: 72 IN | DIASTOLIC BLOOD PRESSURE: 81 MMHG | OXYGEN SATURATION: 94 % | BODY MASS INDEX: 42.66 KG/M2 | SYSTOLIC BLOOD PRESSURE: 127 MMHG | WEIGHT: 315 LBS | HEART RATE: 106 BPM

## 2025-06-12 DIAGNOSIS — E55.9 VITAMIN D DEFICIENCY: ICD-10-CM

## 2025-06-12 DIAGNOSIS — Z00.00 WELLNESS EXAMINATION: Primary | ICD-10-CM

## 2025-06-12 DIAGNOSIS — Z11.3 ROUTINE SCREENING FOR STI (SEXUALLY TRANSMITTED INFECTION): ICD-10-CM

## 2025-06-12 DIAGNOSIS — G47.19 EXCESSIVE DAYTIME SLEEPINESS: ICD-10-CM

## 2025-06-12 DIAGNOSIS — J45.40 ASTHMA, CHRONIC, MODERATE PERSISTENT, UNCOMPLICATED (HHS-HCC): ICD-10-CM

## 2025-06-12 DIAGNOSIS — R06.83 SNORING: ICD-10-CM

## 2025-06-12 DIAGNOSIS — L83 ACANTHOSIS NIGRICANS: ICD-10-CM

## 2025-06-12 PROBLEM — M72.2 PLANTAR FASCIITIS, LEFT: Status: ACTIVE | Noted: 2025-06-12

## 2025-06-12 PROBLEM — R63.8 INCREASED BODY MASS INDEX (BMI): Status: ACTIVE | Noted: 2025-06-12

## 2025-06-12 PROBLEM — M25.572 PAIN IN LEFT ANKLE AND JOINTS OF LEFT FOOT: Status: ACTIVE | Noted: 2025-06-12

## 2025-06-12 PROBLEM — R06.09 DYSPNEA ON EXERTION: Status: ACTIVE | Noted: 2025-06-12

## 2025-06-12 PROBLEM — M21.40 ACQUIRED PES PLANUS: Status: ACTIVE | Noted: 2025-06-12

## 2025-06-12 PROBLEM — Z91.199 GENERAL PATIENT NONCOMPLIANCE: Status: ACTIVE | Noted: 2025-06-12

## 2025-06-12 PROBLEM — J02.9 SORE THROAT: Status: ACTIVE | Noted: 2025-06-12

## 2025-06-12 PROBLEM — S39.91XA: Status: ACTIVE | Noted: 2025-06-12

## 2025-06-12 PROBLEM — R52 PAIN: Status: ACTIVE | Noted: 2025-06-12

## 2025-06-12 PROBLEM — S01.511A LIP LACERATION: Status: ACTIVE | Noted: 2025-06-12

## 2025-06-12 PROCEDURE — 90677 PCV20 VACCINE IM: CPT | Performed by: STUDENT IN AN ORGANIZED HEALTH CARE EDUCATION/TRAINING PROGRAM

## 2025-06-12 PROCEDURE — 1036F TOBACCO NON-USER: CPT | Performed by: STUDENT IN AN ORGANIZED HEALTH CARE EDUCATION/TRAINING PROGRAM

## 2025-06-12 PROCEDURE — 3008F BODY MASS INDEX DOCD: CPT | Performed by: STUDENT IN AN ORGANIZED HEALTH CARE EDUCATION/TRAINING PROGRAM

## 2025-06-12 PROCEDURE — 99385 PREV VISIT NEW AGE 18-39: CPT | Performed by: STUDENT IN AN ORGANIZED HEALTH CARE EDUCATION/TRAINING PROGRAM

## 2025-06-12 PROCEDURE — 90471 IMMUNIZATION ADMIN: CPT | Performed by: STUDENT IN AN ORGANIZED HEALTH CARE EDUCATION/TRAINING PROGRAM

## 2025-06-12 RX ORDER — LORATADINE 10 MG/1
1 TABLET ORAL DAILY
COMMUNITY
Start: 2013-07-23 | End: 2025-06-12 | Stop reason: ALTCHOICE

## 2025-06-12 ASSESSMENT — PATIENT HEALTH QUESTIONNAIRE - PHQ9
2. FEELING DOWN, DEPRESSED OR HOPELESS: NOT AT ALL
1. LITTLE INTEREST OR PLEASURE IN DOING THINGS: NOT AT ALL
SUM OF ALL RESPONSES TO PHQ9 QUESTIONS 1 AND 2: 0

## 2025-06-12 ASSESSMENT — ENCOUNTER SYMPTOMS
DEPRESSION: 0
OCCASIONAL FEELINGS OF UNSTEADINESS: 0
LOSS OF SENSATION IN FEET: 0

## 2025-06-12 ASSESSMENT — COLUMBIA-SUICIDE SEVERITY RATING SCALE - C-SSRS
6. HAVE YOU EVER DONE ANYTHING, STARTED TO DO ANYTHING, OR PREPARED TO DO ANYTHING TO END YOUR LIFE?: NO
1. IN THE PAST MONTH, HAVE YOU WISHED YOU WERE DEAD OR WISHED YOU COULD GO TO SLEEP AND NOT WAKE UP?: NO
2. HAVE YOU ACTUALLY HAD ANY THOUGHTS OF KILLING YOURSELF?: NO

## 2025-06-12 NOTE — PROGRESS NOTES
HPI: 20-year-old male presenting to \[facility]\"" care, CPE.  Doing relatively well without any new concerns.  Works in manufacturing company, happy with employment.  Reports they have a good diet and routine exercise.  Does admit to snoring, being tired throughout most days.  Never been tested for sleep apnea.      Medical history: Asthma, allergies  Fam Hx: DMII  SocHx:   - Smoking: Never   - Alcohol: None   - Drug use: occasional marijuana    Denies HA, changes in vision, chest pain, SOB/HOPSON, palpitations, N/V/D/C, dysuria/hematuria, hematochezia/melena, paresthesias, LE edema.    12 point ROS reviewed and negative other than as stated in HPI      General: Alert, oriented, pleasant, in no acute distress  HEENT:      Head: normocephalic, atraumatic;      eyes: EOMI, no scleral icterus;   Neck: soft, supple, non-tender, no masses appreciated  CV: Heart with regular rate and rhythm, normal S1/S2, no murmurs  Lungs: CTAB without wheezing, rhonchi or rales; good respiratory effort, no increased work of breathing  Abdomen: soft, non-tender, non-distended, no masses appreciated, limited by body habitus  Extremities: no edema, no cyanosis  MSK: ROM of upper and lower extremities intact; strength 5/5 upper and lower extremities  Neuro: Cranial nerves grossly intact; alert and oriented, normal gait  Psych: Appropriate mood and affect    #HM  -CBC, CMP, Lipid panel, Vit D, TSH with reflex T4, asymptomatic  -Vaccines:       Flu: out of season      Shingrix: At 50      Pneumococcal: Prevnar 20 in office      Tdap: UTD 2022  -Lung cancer screening with low-dose CT: Never smoker  -Colonoscopy:  at 44    #Moderate persistent asthma #allergic rhinitis  -Currently on Symbicort, albuterol as needed, cetirizine  - Continue to follow with allergy/immunology    #Acanthosis nigricans  - Family history of diabetes  - A1c    #Snoring #excessive daytime sleepiness  - Home sleep study    F/U annually, unless abnormal testing    Chris D'Amico,  DO

## 2025-06-13 LAB
25(OH)D3+25(OH)D2 SERPL-MCNC: 22 NG/ML (ref 30–100)
ALBUMIN SERPL-MCNC: 4.7 G/DL (ref 3.6–5.1)
ALP SERPL-CCNC: 54 U/L (ref 36–130)
ALT SERPL-CCNC: 32 U/L (ref 9–46)
ANION GAP SERPL CALCULATED.4IONS-SCNC: 11 MMOL/L (CALC) (ref 7–17)
AST SERPL-CCNC: 22 U/L (ref 10–40)
BILIRUB SERPL-MCNC: 0.5 MG/DL (ref 0.2–1.2)
BUN SERPL-MCNC: 15 MG/DL (ref 7–25)
C TRACH RRNA SPEC QL NAA+PROBE: NOT DETECTED
CALCIUM SERPL-MCNC: 10.4 MG/DL (ref 8.6–10.3)
CHLORIDE SERPL-SCNC: 105 MMOL/L (ref 98–110)
CHOLEST SERPL-MCNC: 200 MG/DL
CHOLEST/HDLC SERPL: 4.7 (CALC)
CO2 SERPL-SCNC: 27 MMOL/L (ref 20–32)
CREAT SERPL-MCNC: 1.13 MG/DL (ref 0.6–1.24)
EGFRCR SERPLBLD CKD-EPI 2021: 95 ML/MIN/1.73M2
ERYTHROCYTE [DISTWIDTH] IN BLOOD BY AUTOMATED COUNT: 13.6 % (ref 11–15)
EST. AVERAGE GLUCOSE BLD GHB EST-MCNC: 120 MG/DL
EST. AVERAGE GLUCOSE BLD GHB EST-SCNC: 6.6 MMOL/L
GLUCOSE SERPL-MCNC: 92 MG/DL (ref 65–99)
HBA1C MFR BLD: 5.8 %
HBV SURFACE AG SERPL QL IA: NORMAL
HCT VFR BLD AUTO: 48.7 % (ref 38.5–50)
HCV AB SERPL QL IA: NORMAL
HDLC SERPL-MCNC: 43 MG/DL
HGB BLD-MCNC: 16.2 G/DL (ref 13.2–17.1)
HIV 1+2 AB+HIV1 P24 AG SERPL QL IA: NORMAL
LDLC SERPL CALC-MCNC: 140 MG/DL (CALC)
MCH RBC QN AUTO: 32 PG (ref 27–33)
MCHC RBC AUTO-ENTMCNC: 33.3 G/DL (ref 32–36)
MCV RBC AUTO: 96.1 FL (ref 80–100)
N GONORRHOEA RRNA SPEC QL NAA+PROBE: NOT DETECTED
NONHDLC SERPL-MCNC: 157 MG/DL (CALC)
PLATELET # BLD AUTO: 285 THOUSAND/UL (ref 140–400)
PMV BLD REES-ECKER: 11.4 FL (ref 7.5–12.5)
POTASSIUM SERPL-SCNC: 4.6 MMOL/L (ref 3.5–5.3)
PROT SERPL-MCNC: 7.4 G/DL (ref 6.1–8.1)
QUEST GC CT AMPLIFIED (ALWAYS MESSAGE): NORMAL
RBC # BLD AUTO: 5.07 MILLION/UL (ref 4.2–5.8)
SODIUM SERPL-SCNC: 143 MMOL/L (ref 135–146)
T PALLIDUM AB SER QL IA: NORMAL
T VAGINALIS RRNA SPEC QL NAA+PROBE: NOT DETECTED
TRIGL SERPL-MCNC: 74 MG/DL
TSH SERPL-ACNC: 1.33 MIU/L (ref 0.4–4.5)
WBC # BLD AUTO: 8.7 THOUSAND/UL (ref 3.8–10.8)

## 2025-06-16 LAB
25(OH)D3+25(OH)D2 SERPL-MCNC: 22 NG/ML (ref 30–100)
ALBUMIN SERPL-MCNC: 4.7 G/DL (ref 3.6–5.1)
ALP SERPL-CCNC: 54 U/L (ref 36–130)
ALT SERPL-CCNC: 32 U/L (ref 9–46)
ANION GAP SERPL CALCULATED.4IONS-SCNC: 11 MMOL/L (CALC) (ref 7–17)
AST SERPL-CCNC: 22 U/L (ref 10–40)
BILIRUB SERPL-MCNC: 0.5 MG/DL (ref 0.2–1.2)
BUN SERPL-MCNC: 15 MG/DL (ref 7–25)
C TRACH RRNA SPEC QL NAA+PROBE: NOT DETECTED
CALCIUM SERPL-MCNC: 10.4 MG/DL (ref 8.6–10.3)
CHLORIDE SERPL-SCNC: 105 MMOL/L (ref 98–110)
CHOLEST SERPL-MCNC: 200 MG/DL
CHOLEST/HDLC SERPL: 4.7 (CALC)
CO2 SERPL-SCNC: 27 MMOL/L (ref 20–32)
CREAT SERPL-MCNC: 1.13 MG/DL (ref 0.6–1.24)
EGFRCR SERPLBLD CKD-EPI 2021: 95 ML/MIN/1.73M2
ERYTHROCYTE [DISTWIDTH] IN BLOOD BY AUTOMATED COUNT: 13.6 % (ref 11–15)
EST. AVERAGE GLUCOSE BLD GHB EST-MCNC: 120 MG/DL
EST. AVERAGE GLUCOSE BLD GHB EST-SCNC: 6.6 MMOL/L
GLUCOSE SERPL-MCNC: 92 MG/DL (ref 65–99)
HBA1C MFR BLD: 5.8 %
HBV SURFACE AG SERPL QL IA: NORMAL
HCT VFR BLD AUTO: 48.7 % (ref 38.5–50)
HCV AB SERPL QL IA: NORMAL
HDLC SERPL-MCNC: 43 MG/DL
HGB BLD-MCNC: 16.2 G/DL (ref 13.2–17.1)
HIV 1+2 AB+HIV1 P24 AG SERPL QL IA: NORMAL
LDLC SERPL CALC-MCNC: 140 MG/DL (CALC)
MCH RBC QN AUTO: 32 PG (ref 27–33)
MCHC RBC AUTO-ENTMCNC: 33.3 G/DL (ref 32–36)
MCV RBC AUTO: 96.1 FL (ref 80–100)
N GONORRHOEA RRNA SPEC QL NAA+PROBE: NOT DETECTED
NONHDLC SERPL-MCNC: 157 MG/DL (CALC)
PLATELET # BLD AUTO: 285 THOUSAND/UL (ref 140–400)
PMV BLD REES-ECKER: 11.4 FL (ref 7.5–12.5)
POTASSIUM SERPL-SCNC: 4.6 MMOL/L (ref 3.5–5.3)
PROT SERPL-MCNC: 7.4 G/DL (ref 6.1–8.1)
QUEST GC CT AMPLIFIED (ALWAYS MESSAGE): NORMAL
RBC # BLD AUTO: 5.07 MILLION/UL (ref 4.2–5.8)
SODIUM SERPL-SCNC: 143 MMOL/L (ref 135–146)
T PALLIDUM AB SER QL IA: NEGATIVE
T VAGINALIS RRNA SPEC QL NAA+PROBE: NOT DETECTED
TRIGL SERPL-MCNC: 74 MG/DL
TSH SERPL-ACNC: 1.33 MIU/L (ref 0.4–4.5)
WBC # BLD AUTO: 8.7 THOUSAND/UL (ref 3.8–10.8)

## 2025-06-19 ENCOUNTER — TELEPHONE (OUTPATIENT)
Dept: PRIMARY CARE | Facility: CLINIC | Age: 20
End: 2025-06-19
Payer: COMMERCIAL

## 2025-06-19 NOTE — TELEPHONE ENCOUNTER
Result Communication    Resulted Orders   CBC   Result Value Ref Range    WHITE BLOOD CELL COUNT 8.7 3.8 - 10.8 Thousand/uL    RED BLOOD CELL COUNT 5.07 4.20 - 5.80 Million/uL    HEMOGLOBIN 16.2 13.2 - 17.1 g/dL    HEMATOCRIT 48.7 38.5 - 50.0 %    MCV 96.1 80.0 - 100.0 fL    MCH 32.0 27.0 - 33.0 pg    MCHC 33.3 32.0 - 36.0 g/dL      Comment:      For adults, a slight decrease in the calculated MCHC  value (in the range of 30 to 32 g/dL) is most likely  not clinically significant; however, it should be  interpreted with caution in correlation with other  red cell parameters and the patient's clinical  condition.      RDW 13.6 11.0 - 15.0 %    PLATELET COUNT 285 140 - 400 Thousand/uL    MPV 11.4 7.5 - 12.5 fL    Narrative    FASTING:NO    FASTING: NO   Lipid Panel   Result Value Ref Range    CHOLESTEROL, TOTAL 200 (H) <200 mg/dL    HDL CHOLESTEROL 43 > OR = 40 mg/dL    TRIGLYCERIDES 74 <150 mg/dL    LDL-CHOLESTEROL 140 (H) mg/dL (calc)      Comment:      Reference range: <100     Desirable range <100 mg/dL for primary prevention;    <70 mg/dL for patients with CHD or diabetic patients   with > or = 2 CHD risk factors.     LDL-C is now calculated using the Alfonzo-Kirit   calculation, which is a validated novel method providing   better accuracy than the Friedewald equation in the   estimation of LDL-C.   Alfonzo TIMMONS et al. ELIZABETH. 2013;310(19): 5441-7732   (http://education.Intern.BuddyTV/faq/JPH107)      CHOL/HDLC RATIO 4.7 <5.0 (calc)    NON HDL CHOLESTEROL 157 (H) <130 mg/dL (calc)      Comment:      For patients with diabetes plus 1 major ASCVD risk   factor, treating to a non-HDL-C goal of <100 mg/dL   (LDL-C of <70 mg/dL) is considered a therapeutic   option.      Narrative    FASTING:NO    FASTING: NO   Comprehensive Metabolic Panel   Result Value Ref Range    GLUCOSE 92 65 - 99 mg/dL      Comment:                    Fasting reference interval         UREA NITROGEN (BUN) 15 7 - 25 mg/dL    CREATININE 1.13  0.60 - 1.24 mg/dL    EGFR 95 > OR = 60 mL/min/1.73m2    SODIUM 143 135 - 146 mmol/L    POTASSIUM 4.6 3.5 - 5.3 mmol/L    CHLORIDE 105 98 - 110 mmol/L    CARBON DIOXIDE 27 20 - 32 mmol/L    ELECTROLYTE BALANCE 11 7 - 17 mmol/L (calc)    CALCIUM 10.4 (H) 8.6 - 10.3 mg/dL    PROTEIN, TOTAL 7.4 6.1 - 8.1 g/dL    ALBUMIN 4.7 3.6 - 5.1 g/dL    BILIRUBIN, TOTAL 0.5 0.2 - 1.2 mg/dL    ALKALINE PHOSPHATASE 54 36 - 130 U/L    AST 22 10 - 40 U/L    ALT 32 9 - 46 U/L    Narrative    FASTING:NO    FASTING: NO   TSH with reflex to Free T4 if abnormal   Result Value Ref Range    TSH W/REFLEX TO FT4 1.33 0.40 - 4.50 mIU/L    Narrative    FASTING:NO    FASTING: NO   Vitamin D 25-Hydroxy,Total (for eval of Vitamin D levels)   Result Value Ref Range    VITAMIN D,25-OH,TOTAL,IA 22 (L) 30 - 100 ng/mL      Comment:      Vitamin D Status         25-OH Vitamin D:     Deficiency:                    <20 ng/mL  Insufficiency:             20 - 29 ng/mL  Optimal:                 > or = 30 ng/mL     For 25-OH Vitamin D testing on patients on   D2-supplementation and patients for whom quantitation   of D2 and D3 fractions is required, the QuestAssureD(TM)  25-OH VIT D, (D2,D3), LC/MS/MS is recommended: order   code 96988 (patients >2yrs).     See Note 1     Note 1     For additional information, please refer to   http://education.AwoX.Jada Beauty/faq/LVH724   (This link is being provided for informational/  educational purposes only.)      Narrative    FASTING:NO    FASTING: NO   Hemoglobin A1C   Result Value Ref Range    HEMOGLOBIN A1c 5.8 (H) <5.7 %      Comment:      For someone without known diabetes, a hemoglobin   A1c value between 5.7% and 6.4% is consistent with  prediabetes and should be confirmed with a   follow-up test.     For someone with known diabetes, a value <7%  indicates that their diabetes is well controlled. A1c  targets should be individualized based on duration of  diabetes, age, comorbid conditions, and  other  considerations.     This assay result is consistent with an increased risk  of diabetes.     Currently, no consensus exists regarding use of  hemoglobin A1c for diagnosis of diabetes for children.         eAG (mg/dL) 120 mg/dL    eAG (mmol/L) 6.6 mmol/L    Narrative    FASTING:NO    FASTING: NO   C. trachomatis / N. gonorrhoeae, Amplified, Urogenital   Result Value Ref Range    CHLAMYDIA TRACHOMATIS RNA, TMA, UROGENITAL NOT DETECTED NOT DETECTED    NEISSERIA GONORRHOEAE RNA, TMA, UROGENITAL NOT DETECTED NOT DETECTED    (Always Message)        Comment:      The analytical performance characteristics of this  assay, when used to test SurePath(TM) specimens have been  determined by [employer]. The modifications have  not been cleared or approved by the FDA. This assay has  been validated pursuant to the CLIA regulations and is  used for clinical purposes.     For additional information, please refer to  https://[employer].Gramco/faq/TVR994  (This link is being provided for information/  educational purposes only.)         Narrative    FASTING:NO    FASTING: NO   Hepatitis B Surface Antigen   Result Value Ref Range    HEPATITIS B SURFACE ANTIGEN NON-REACTIVE NON-REACTIVE      Comment:         For additional information, please refer to   http://[employer].Gramco/faq/ZCW411   (This link is being provided for informational/  educational purposes only.)         Narrative    FASTING:NO    FASTING: NO   Hepatitis C Antibody   Result Value Ref Range    HEPATITIS C ANTIBODY NON-REACTIVE NON-REACTIVE      Comment:         HCV antibody was non-reactive. There is no laboratory   evidence of HCV infection.     In most cases, no further action is required. However,  if recent HCV exposure is suspected, a test for HCV RNA  (test code 21134) is suggested.     For additional information please refer to  http://education.Gramco/faq/DGB30z0  (This link is being provided for  informational/  educational purposes only.)         Narrative    FASTING:NO    FASTING: NO   HIV 1/2 Antigen/Antibody Screen with Reflex to Confirmation   Result Value Ref Range    HIV AG/AB, 4TH GEN NON-REACTIVE NON-REACTIVE      Comment:      HIV-1 antigen and HIV-1/HIV-2 antibodies were not  detected. There is no laboratory evidence of HIV  infection.     PLEASE NOTE: This information has been disclosed to  you from records whose confidentiality may be  protected by state law.  If your state requires such  protection, then the state law prohibits you from  making any further disclosure of the information  without the specific written consent of the person  to whom it pertains, or as otherwise permitted by law.  A general authorization for the release of medical or  other information is NOT sufficient for this purpose.      For additional information please refer to  http://Health 123.Vinogusto.com/faq/BCU773  (This link is being provided for informational/  educational purposes only.)        The performance of this assay has not been clinically  validated in patients less than 2 years old.         Narrative    FASTING:NO    FASTING: NO   Syphilis Screen with Reflex   Result Value Ref Range    T. PALLIDUM AB Negative Negative      Comment:         No antibodies to T. pallidum (the agent causing  syphilis) were detected in the specimen. This result,  however, does not exclude very recent T. pallidum  infection; testing of a second specimen, collected 2-4  weeks after this specimen, is recommended if the index  of suspicion for recent infection is high.         Narrative    FASTING:NO    FASTING: NO   Trichomonas vaginalis, Amplified   Result Value Ref Range    TRICHOMONAS VAGINALIS RNA, QL TMA NOT DETECTED NOT DETECTED      Comment:      For additional information, please refer to  [email or web address]  faq/Trichomonastma  (This link is being provided for informational/  educational purposes  only.)         Narrative    FASTING:NO    FASTING: NO       11:48 AM      Results were successfully communicated with the patient and they acknowledged their understanding.

## 2025-06-19 NOTE — TELEPHONE ENCOUNTER
----- Message from Christopher D'Amico sent at 6/17/2025  7:30 PM EDT -----  Vitamin D mildly low at 22, recommend OTC vitamin D3, 2000 units daily.    Hemoglobin A1c at 5.8.  This is prediabetic range, we need to reduce carbohydrates and processed foods.    LDL moderately elevated at 140, decent HDL at 43.  Really want to work on improving diet, recommend Mediterranean diet.    Borderline elevated calcium at 10.4.  No clear etiology, may be related to low vitamin D, will monitor on repeat labs    Remaining labs unremarkable.  ----- Message -----  From: Ronny BombBomb Results In  Sent: 6/13/2025   3:31 AM EDT  To: Christopher D'Amico, DO

## 2026-06-11 ENCOUNTER — APPOINTMENT (OUTPATIENT)
Dept: PRIMARY CARE | Facility: CLINIC | Age: 21
End: 2026-06-11
Payer: COMMERCIAL